# Patient Record
Sex: FEMALE | Race: OTHER | HISPANIC OR LATINO | Employment: FULL TIME | URBAN - METROPOLITAN AREA
[De-identification: names, ages, dates, MRNs, and addresses within clinical notes are randomized per-mention and may not be internally consistent; named-entity substitution may affect disease eponyms.]

---

## 2023-11-22 ENCOUNTER — HOSPITAL ENCOUNTER (EMERGENCY)
Facility: MEDICAL CENTER | Age: 28
End: 2023-11-22
Attending: EMERGENCY MEDICINE

## 2023-11-22 ENCOUNTER — APPOINTMENT (OUTPATIENT)
Dept: RADIOLOGY | Facility: MEDICAL CENTER | Age: 28
End: 2023-11-22
Attending: EMERGENCY MEDICINE

## 2023-11-22 VITALS
OXYGEN SATURATION: 96 % | DIASTOLIC BLOOD PRESSURE: 62 MMHG | TEMPERATURE: 97.7 F | BODY MASS INDEX: 29.23 KG/M2 | HEART RATE: 88 BPM | SYSTOLIC BLOOD PRESSURE: 102 MMHG | WEIGHT: 145 LBS | HEIGHT: 59 IN | RESPIRATION RATE: 15 BRPM

## 2023-11-22 DIAGNOSIS — S39.012A STRAIN OF LUMBAR REGION, INITIAL ENCOUNTER: ICD-10-CM

## 2023-11-22 PROCEDURE — 99284 EMERGENCY DEPT VISIT MOD MDM: CPT

## 2023-11-22 PROCEDURE — 96372 THER/PROPH/DIAG INJ SC/IM: CPT

## 2023-11-22 PROCEDURE — 700111 HCHG RX REV CODE 636 W/ 250 OVERRIDE (IP): Mod: JZ | Performed by: EMERGENCY MEDICINE

## 2023-11-22 PROCEDURE — 700102 HCHG RX REV CODE 250 W/ 637 OVERRIDE(OP): Performed by: EMERGENCY MEDICINE

## 2023-11-22 PROCEDURE — 72100 X-RAY EXAM L-S SPINE 2/3 VWS: CPT

## 2023-11-22 PROCEDURE — A9270 NON-COVERED ITEM OR SERVICE: HCPCS | Performed by: EMERGENCY MEDICINE

## 2023-11-22 RX ORDER — METHOCARBAMOL 500 MG/1
500 TABLET, FILM COATED ORAL 4 TIMES DAILY PRN
Qty: 14 TABLET | Refills: 0 | Status: SHIPPED | OUTPATIENT
Start: 2023-11-22 | End: 2023-11-27 | Stop reason: SDUPTHER

## 2023-11-22 RX ORDER — NAPROXEN 375 MG/1
375 TABLET ORAL 2 TIMES DAILY WITH MEALS
Qty: 14 TABLET | Refills: 0 | Status: SHIPPED | OUTPATIENT
Start: 2023-11-22 | End: 2023-11-27 | Stop reason: SDUPTHER

## 2023-11-22 RX ORDER — KETOROLAC TROMETHAMINE 30 MG/ML
30 INJECTION, SOLUTION INTRAMUSCULAR; INTRAVENOUS ONCE
Status: COMPLETED | OUTPATIENT
Start: 2023-11-22 | End: 2023-11-22

## 2023-11-22 RX ORDER — METHOCARBAMOL 500 MG/1
500 TABLET, FILM COATED ORAL ONCE
Status: COMPLETED | OUTPATIENT
Start: 2023-11-22 | End: 2023-11-22

## 2023-11-22 RX ADMIN — KETOROLAC TROMETHAMINE 30 MG: 30 INJECTION, SOLUTION INTRAMUSCULAR; INTRAVENOUS at 20:36

## 2023-11-22 RX ADMIN — METHOCARBAMOL 500 MG: 500 TABLET ORAL at 20:36

## 2023-11-22 ASSESSMENT — PAIN DESCRIPTION - PAIN TYPE
TYPE: ACUTE PAIN
TYPE: ACUTE PAIN

## 2023-11-22 NOTE — Clinical Note
Shabana Eli was seen and treated in our emergency department on 11/22/2023.  She may return to work on 11/29/2023.       If you have any questions or concerns, please don't hesitate to call.      Eduardo Tai M.D.

## 2023-11-23 NOTE — ED PROVIDER NOTES
"ED Provider Note    CHIEF COMPLAINT  Chief Complaint   Patient presents with    Low Back Pain     Bilateral low back pain since 1000 this morning. Pt reports it began on the right and radiates to the left. Denies numbness or tingling to the lower extremities.        EXTERNAL RECORDS REVIEWED  Other none available in electronic medical record    HPI/ROS  LIMITATION TO HISTORY   Select: Language Green[place]ic,  Used   OUTSIDE HISTORIAN(S):  Family notes patient has had symptoms for about a month    Shabana Eli is a 28 y.o. female who presents for evaluation of low back pain.  Patient has had symptoms for approximately 1 month.  She notes no particular major trauma but does relate at work she does a lot of bending and twisting and lifting and pain began after this.  Pain is localized to the low back, initially on the left and now midline, left, and right.  Pain does not radiate down the legs, she has no numbness or weakness in the legs.  No bowel or bladder dysfunction.  No fever, no vomiting.  Otherwise healthy, no previous back surgeries nor remote traumas.    PAST MEDICAL HISTORY   has a past medical history of Patient denies medical problems.    SURGICAL HISTORY  patient denies any surgical history    FAMILY HISTORY  Noncontributory    SOCIAL HISTORY  Social History     Tobacco Use    Smoking status: Never    Smokeless tobacco: Never   Substance and Sexual Activity    Alcohol use: Never    Drug use: Never    Sexual activity: Not on file       CURRENT MEDICATIONS  Home Medications       Reviewed by Chantale Evans RCINDA (Registered Nurse) on 11/22/23 at 1933  Med List Status: Partial     Medication Last Dose Status        Patient Ke Taking any Medications                           ALLERGIES  No Known Allergies    PHYSICAL EXAM  VITAL SIGNS: /62   Pulse 88   Temp 36.5 °C (97.7 °F) (Temporal)   Resp 15   Ht 1.5 m (4' 11.06\")   Wt 65.8 kg (145 lb)   SpO2 96%   BMI 29.23 kg/m²    General: " Alert, no acute distress  Skin: Warm, dry, normal for ethnicity  Head: Normocephalic, atraumatic  Neck: Trachea midline  Eye: PERRL, normal conjunctiva  ENMT: Oral mucosa moist, no pharyngeal erythema or exudate  Cardiovascular:  Normal peripheral perfusion  Respiratory:  respirations are non-labored  Musculoskeletal: No swelling, no deformity.  Tenderness to palpation at the upper lumbar region at the midline as well as paraspinous regions, no step-off nor swelling.  She has bilateral SI joint tenderness.  Negative straight leg raise, no foot drop, no saddle anesthesia.  Neurological: Alert and oriented to person, place, time, and situation.  5 x 5 dorsal and plantarflexion of both feet, sensation to light touch grossly intact and symmetrical throughout the lower extremities including the saddle region.  Psychiatric: Cooperative, appropriate mood & affect     DIAGNOSTIC STUDIES / PROCEDURES      RADIOLOGY  I have independently interpreted the diagnostic imaging associated with this visit and am waiting the final reading from the radiologist.   My preliminary interpretation is as follows: No evidence of fracture nor significant pathologic malalignment  Radiologist interpretation:   DX-LUMBAR SPINE-2 OR 3 VIEWS   Final Result      No evidence of displaced fracture.   Mild degenerative disc height loss at L5-S1.   Partially visualized, minimal S-shaped scoliosis of the thoracolumbar spine.           COURSE & MEDICAL DECISION MAKING    ED Observation Status? Yes; I am placing the patient in to an observation status due to a diagnostic uncertainty as well as therapeutic intensity. Patient placed in observation status at 8:06 PM, 11/22/2023.     Observation plan is as follows: Patient medicated with ketorolac 30 mg IM, Robaxin 500 mg p.o.  X-ray imaging of lumbosacral spine will be obtained.  Differential diagnosis at this point includes but is not restricted to lumbar sprain, degenerative disc disease, muscle spasm,  "musculoskeletal pain    2053 patient reassessed, in no acute distress.  I have updated her with x-ray results.  She is amenable to plan of care with regard to follow-up with primary care.  I have written her off work with return to the 29th given clearly is some aspect of repetitive stress injury.    Patient Vitals for the past 24 hrs:   BP Temp Temp src Pulse Resp SpO2 Height Weight   11/22/23 2041 102/62 36.5 °C (97.7 °F) Temporal 88 15 96 % -- --   11/22/23 2004 108/66 36.5 °C (97.7 °F) Temporal 74 15 95 % -- --   11/22/23 1930 -- -- -- -- -- -- 1.5 m (4' 11.06\") 65.8 kg (145 lb)   11/22/23 1840 110/74 36.7 °C (98 °F) Temporal 95 18 97 % -- --        Upon Reevaluation, the patient's condition has: Improved; and will be discharged.    Patient discharged from ED Observation status at 2053 (Time) 11/22/23 (Date).     INITIAL ASSESSMENT, COURSE AND PLAN  Care Narrative: This patient is a well in appearance otherwise healthy 28-year-old who presents for evaluation of acute low back pain after bending and lifting while at work.  Reassuringly pain does not radiate down the legs nor does she have numbness nor weakness nor any bowel or bladder dysfunction.  This is not consistent with radiculopathy.  Given no foot drop and no saddle anesthesia there is no indication for emergent MRI nor neurosurgical intervention.  Written for Robaxin and NSAIDs, I would like her off work for the next several days and she is amenable to outpatient follow-up with primary care.        ADDITIONAL PROBLEM LIST  Lumbar strain  DISPOSITION AND DISCUSSIONS  I have discussed management of the patient with the following physicians and BROOKS's:  NA    Discussion of management with other QHP or appropriate source(s): None     Escalation of care considered, and ultimately not performed:NA    Barriers to care at this time, including but not limited to:  NA .     Decision tools and prescription drugs considered including, but not limited to:  NA .    The " patient will return for new or worsening symptoms and is stable at the time of discharge.    DISPOSITION:  Patient will be discharged home in stable condition.    FOLLOW UP:  [address]  [place] [address]  [phone]  Schedule an appointment as soon as possible for a visit         OUTPATIENT MEDICATIONS:  New Prescriptions    METHOCARBAMOL (ROBAXIN) 500 MG TAB    Take 1 Tablet by mouth 4 times a day as needed (Muscle Spasm).    NAPROXEN (NAPROSYN) 375 MG TAB    Take 1 Tablet by mouth 2 times a day with meals for 7 days.          FINAL DIAGNOSIS  1. Strain of lumbar region, initial encounter           Electronically signed by: Eduardo Tai M.D., 11/22/2023 8:00 PM

## 2023-11-23 NOTE — ED TRIAGE NOTES
Chief Complaint   Patient presents with    Low Back Pain     Bilateral low back pain since 1000 this morning. Pt reports it began on the right and radiates to the left. Denies numbness or tingling to the lower extremities.       Patient ambulatory to triage for above complaint. Patient A&Ox4, GCS 15, patient speaking in full sentences. Equal and unlabored respirations. Patient educated on triage process and encouraged to notify staff if condition worsens. Appropriate protocols ordered. Patient returned to the lobby in stable condition.

## 2023-11-27 ENCOUNTER — OFFICE VISIT (OUTPATIENT)
Dept: MEDICAL GROUP | Facility: MEDICAL CENTER | Age: 28
End: 2023-11-27
Attending: EMERGENCY MEDICINE

## 2023-11-27 VITALS
OXYGEN SATURATION: 100 % | DIASTOLIC BLOOD PRESSURE: 58 MMHG | WEIGHT: 145.06 LBS | HEIGHT: 59 IN | BODY MASS INDEX: 29.24 KG/M2 | SYSTOLIC BLOOD PRESSURE: 100 MMHG | TEMPERATURE: 97.6 F | HEART RATE: 58 BPM

## 2023-11-27 DIAGNOSIS — Z02.9 ADMINISTRATIVE ENCOUNTER: ICD-10-CM

## 2023-11-27 DIAGNOSIS — M41.35 THORACOGENIC SCOLIOSIS OF THORACOLUMBAR REGION: ICD-10-CM

## 2023-11-27 DIAGNOSIS — S39.012A STRAIN OF LUMBAR REGION, INITIAL ENCOUNTER: ICD-10-CM

## 2023-11-27 PROCEDURE — 3074F SYST BP LT 130 MM HG: CPT | Performed by: STUDENT IN AN ORGANIZED HEALTH CARE EDUCATION/TRAINING PROGRAM

## 2023-11-27 PROCEDURE — 99203 OFFICE O/P NEW LOW 30 MIN: CPT | Performed by: STUDENT IN AN ORGANIZED HEALTH CARE EDUCATION/TRAINING PROGRAM

## 2023-11-27 PROCEDURE — 3078F DIAST BP <80 MM HG: CPT | Performed by: STUDENT IN AN ORGANIZED HEALTH CARE EDUCATION/TRAINING PROGRAM

## 2023-11-27 RX ORDER — METHOCARBAMOL 500 MG/1
500 TABLET, FILM COATED ORAL 4 TIMES DAILY PRN
Qty: 30 TABLET | Refills: 2 | Status: SHIPPED | OUTPATIENT
Start: 2023-11-27

## 2023-11-27 RX ORDER — NAPROXEN 375 MG/1
375 TABLET ORAL 2 TIMES DAILY WITH MEALS
Qty: 60 TABLET | Refills: 0 | Status: SHIPPED | OUTPATIENT
Start: 2023-11-27

## 2023-11-27 ASSESSMENT — PATIENT HEALTH QUESTIONNAIRE - PHQ9: CLINICAL INTERPRETATION OF PHQ2 SCORE: 0

## 2023-11-27 NOTE — LETTER
[facility] [address] [facility] [address]     November 27, 2023    Patient: Shabana Eli   YOB: 1995   Date of Visit: 11/27/2023       To Whom It May Concern:    Shabana Eli was seen and treated in our department on 11/27/2023. Please excuse patient from 11/22/2023 to 12/6/2023 for medical reason. Patient should avoid lifting and pushing more than 20-30 lbs until 1/8/2024. Please accommodate if possible.     Sincerely,     Campos Regalado M.D.

## 2023-11-27 NOTE — PROGRESS NOTES
"Subjective:     CC:  Diagnoses of Strain of lumbar region, initial encounter, Thoracogenic scoliosis of thoracolumbar region, and Need for vaccination were pertinent to this visit.    HISTORY OF THE PRESENT ILLNESS: Patient is a 28 y.o. female. This pleasant patient is here today to establish care and discuss      services were used in the patient's primary language of [place].     Name or Number: [id number]  Mode of interpretation: iPad    Last seen in ED 11/22/2023  For lumbar strain x1 month  Given acute neurologic IM, Robaxin 500, x-ray lumbar without evidence of displaced fracture out scoliosis and thoracolumbar, mild degenerative disc L5-S1    Content of Interpretation:    # lumbar back pain x 1 month   - denies trauma  - report recent episodes aggrevated by  pulling table at work       No problems updated.    Health Maintenance:     ROS:   ROS      Objective:       Exam: /58 (BP Location: Left arm, Patient Position: Sitting)   Pulse (!) 58   Temp 36.4 °C (97.6 °F) (Temporal)   Ht 1.499 m (4' 11\")   Wt 65.8 kg (145 lb 1 oz)   SpO2 100%  Body mass index is 29.3 kg/m².    Physical Exam  Constitutional:       Appearance: Normal appearance.   Cardiovascular:      Rate and Rhythm: Normal rate and regular rhythm.   Pulmonary:      Effort: Pulmonary effort is normal.      Breath sounds: Normal breath sounds.   Musculoskeletal:      Cervical back: Normal range of motion and neck supple.   Lymphadenopathy:      Cervical: No cervical adenopathy.   Neurological:      Mental Status: She is alert.         Labs:     Assessment & Plan:   28 y.o. female with the following -    1. Thoracogenic scoliosis of thoracolumbar region  2. Strain of lumbar region, initial encounter  Acute, stable  Refilled methocarbamol/ naproxen  Patient taking with good control of symptoms  Refer to PT  Rec TYLER/Hopes  Discussed stretches, heating pad, conservative management  - Referral to Physical Therapy  - " methocarbamol (ROBAXIN) 500 MG Tab; Take 1 Tablet by mouth 4 times a day as needed (Muscle Spasm).  Dispense: 30 Tablet; Refill: 2  - naproxen (NAPROSYN) 375 MG Tab; Take 1 Tablet by mouth 2 times a day with meals.  Dispense: 60 Tablet; Refill: 0    3.  Admin encounter  Work note/ letter provided  Rec avoid lifting/ pulling/ pushing > 30lb for 4-6 week.             Return in about 3 months (around 2/27/2024), or if symptoms worsen or fail to improve.    Please note that this dictation was created using voice recognition software. I have made every reasonable attempt to correct obvious errors, but I expect that there are errors of grammar and possibly content that I did not discover before finalizing the note.

## 2024-02-13 ENCOUNTER — APPOINTMENT (OUTPATIENT)
Dept: PHYSICAL THERAPY | Facility: MEDICAL CENTER | Age: 29
End: 2024-02-13
Attending: STUDENT IN AN ORGANIZED HEALTH CARE EDUCATION/TRAINING PROGRAM

## 2024-02-20 ENCOUNTER — APPOINTMENT (OUTPATIENT)
Dept: PHYSICAL THERAPY | Facility: MEDICAL CENTER | Age: 29
End: 2024-02-20
Attending: STUDENT IN AN ORGANIZED HEALTH CARE EDUCATION/TRAINING PROGRAM

## 2024-02-27 ENCOUNTER — APPOINTMENT (OUTPATIENT)
Dept: PHYSICAL THERAPY | Facility: MEDICAL CENTER | Age: 29
End: 2024-02-27
Attending: STUDENT IN AN ORGANIZED HEALTH CARE EDUCATION/TRAINING PROGRAM

## 2024-03-05 ENCOUNTER — APPOINTMENT (OUTPATIENT)
Dept: PHYSICAL THERAPY | Facility: MEDICAL CENTER | Age: 29
End: 2024-03-05
Attending: STUDENT IN AN ORGANIZED HEALTH CARE EDUCATION/TRAINING PROGRAM

## 2024-03-12 ENCOUNTER — APPOINTMENT (OUTPATIENT)
Dept: PHYSICAL THERAPY | Facility: MEDICAL CENTER | Age: 29
End: 2024-03-12
Attending: STUDENT IN AN ORGANIZED HEALTH CARE EDUCATION/TRAINING PROGRAM

## 2024-03-19 ENCOUNTER — APPOINTMENT (OUTPATIENT)
Dept: PHYSICAL THERAPY | Facility: MEDICAL CENTER | Age: 29
End: 2024-03-19
Attending: STUDENT IN AN ORGANIZED HEALTH CARE EDUCATION/TRAINING PROGRAM

## 2024-03-26 ENCOUNTER — APPOINTMENT (OUTPATIENT)
Dept: PHYSICAL THERAPY | Facility: MEDICAL CENTER | Age: 29
End: 2024-03-26
Attending: STUDENT IN AN ORGANIZED HEALTH CARE EDUCATION/TRAINING PROGRAM